# Patient Record
Sex: MALE | Race: WHITE | Employment: OTHER | URBAN - METROPOLITAN AREA
[De-identification: names, ages, dates, MRNs, and addresses within clinical notes are randomized per-mention and may not be internally consistent; named-entity substitution may affect disease eponyms.]

---

## 2022-05-07 ENCOUNTER — HOSPITAL ENCOUNTER (EMERGENCY)
Age: 47
Discharge: HOME | End: 2022-05-07
Attending: EMERGENCY MEDICINE

## 2022-05-07 ENCOUNTER — APPOINTMENT (OUTPATIENT)
Dept: GENERAL RADIOLOGY | Age: 47
End: 2022-05-07
Attending: EMERGENCY MEDICINE

## 2022-05-07 VITALS
HEART RATE: 92 BPM | HEIGHT: 70 IN | WEIGHT: 220 LBS | SYSTOLIC BLOOD PRESSURE: 95 MMHG | BODY MASS INDEX: 31.5 KG/M2 | DIASTOLIC BLOOD PRESSURE: 61 MMHG | RESPIRATION RATE: 16 BRPM | TEMPERATURE: 98 F | OXYGEN SATURATION: 96 %

## 2022-05-07 DIAGNOSIS — M79.641 RIGHT HAND PAIN: Primary | ICD-10-CM

## 2022-05-07 PROCEDURE — 74011250637 HC RX REV CODE- 250/637: Performed by: PHYSICIAN ASSISTANT

## 2022-05-07 PROCEDURE — 73130 X-RAY EXAM OF HAND: CPT

## 2022-05-07 PROCEDURE — 99283 EMERGENCY DEPT VISIT LOW MDM: CPT

## 2022-05-07 RX ORDER — IBUPROFEN 800 MG/1
800 TABLET ORAL
Status: COMPLETED | OUTPATIENT
Start: 2022-05-07 | End: 2022-05-07

## 2022-05-07 RX ADMIN — IBUPROFEN 800 MG: 800 TABLET ORAL at 19:30

## 2022-05-07 NOTE — DISCHARGE INSTRUCTIONS
Patient Requesting a refill.    Medication(s) for Yanet Johnson submitted for a refill request and is pending approval from the Provider.    Caller has been advised that their call does not guarantee an immediate refill. This refill will be reviewed within 24-72 hours by a qualified provider who will determine whether he or she can refill the medication.    Patient has contacted the pharmacy?  n/a    [facility] Response: N/A    Call Back Number: n/a    Can a detailed message be left? Yes_No_---: n/a    Additional information:     Patient is completely out of medications    Patient’s preferred pharmacy has been noted and populated.         [id number] [address] [place]  [address] [address]  Phone: [phone] Fax: [phone]     Use ibuprofen every 8 hours with ice on affected area

## 2022-05-07 NOTE — ED PROVIDER NOTES
27-year-old male who presents to the emergency room with his son who is also a patient at this time, patient's chief complaint today is some right hand pain. He and his son were playing marquez ball together and he accidentally ran his hand into the back of another player. This happened proximate 30 minutes prior to arrival.  Complaining of right hand pain at the second and third finger MCP. Has not use any medication as this occurred just prior to arrival.           No past medical history on file. No past surgical history on file. No family history on file. Social History     Socioeconomic History    Marital status:      Spouse name: Not on file    Number of children: Not on file    Years of education: Not on file    Highest education level: Not on file   Occupational History    Not on file   Tobacco Use    Smoking status: Not on file    Smokeless tobacco: Not on file   Substance and Sexual Activity    Alcohol use: Not on file    Drug use: Not on file    Sexual activity: Not on file   Other Topics Concern    Not on file   Social History Narrative    Not on file     Social Determinants of Health     Financial Resource Strain:     Difficulty of Paying Living Expenses: Not on file   Food Insecurity:     Worried About Running Out of Food in the Last Year: Not on file    [place] of Food in the Last Year: Not on file   Transportation Needs:     Lack of Transportation (Medical): Not on file    Lack of Transportation (Non-Medical):  Not on file   Physical Activity:     Days of Exercise per Week: Not on file    Minutes of Exercise per Session: Not on file   Stress:     Feeling of Stress : Not on file   Social Connections:     Frequency of Communication with Friends and Family: Not on file    Frequency of Social Gatherings with Friends and Family: Not on file    Attends Quaker Services: Not on file    Active Member of Clubs or Organizations: Not on file    Attends Club or Organization Meetings: Not on file    Marital Status: Not on file   Intimate Partner Violence:     Fear of Current or Ex-Partner: Not on file    Emotionally Abused: Not on file    Physically Abused: Not on file    Sexually Abused: Not on file   Housing Stability:     Unable to Pay for Housing in the Last Year: Not on file    Number of [place] in the Last Year: Not on file    Unstable Housing in the Last Year: Not on file         ALLERGIES: Patient has no known allergies. Review of Systems   Constitutional: Negative for chills and fever. HENT: Negative for sore throat. Respiratory: Negative for cough. Cardiovascular: Negative for chest pain. Gastrointestinal: Negative for nausea and vomiting. Musculoskeletal: Positive for arthralgias. Neurological: Negative for dizziness and headaches. All other systems reviewed and are negative. Vitals:    05/07/22 1848   BP: 95/61   Pulse: 92   Resp: 16   Temp: 98 °F (36.7 °C)   SpO2: 96%   Weight: 99.8 kg (220 lb)   Height: 5' 10\" (1.778 m)            Physical Exam  Vitals and nursing note reviewed. Constitutional:       General: He is not in acute distress. Appearance: Normal appearance. He is normal weight. He is not ill-appearing, toxic-appearing or diaphoretic. HENT:      Head: Normocephalic and atraumatic. Eyes:      Pupils: Pupils are equal, round, and reactive to light. Cardiovascular:      Rate and Rhythm: Normal rate. Pulmonary:      Effort: Pulmonary effort is normal.   Abdominal:      General: Abdomen is flat. Musculoskeletal:        Hands:    Neurological:      General: No focal deficit present. Mental Status: He is alert. Psychiatric:         Mood and Affect: Mood normal.          MDM  Number of Diagnoses or Management Options  Diagnosis management comments: 79-year-old male presents with right hand pain. X-ray obtained in triage is unremarkable with no bony abnormalities.   Discharged home with instructions to use ibuprofen and ice. Voice dictation software was used during the making of this note. This software is not perfect and grammatical and other typographical errors may be present. This note has been proofread, but may still contain errors.    Janice Boothe PA-C        Amount and/or Complexity of Data Reviewed  Tests in the radiology section of CPT®: ordered and reviewed  Independent visualization of images, tracings, or specimens: yes    Risk of Complications, Morbidity, and/or Mortality  Presenting problems: low  Diagnostic procedures: low  Management options: low           Procedures

## 2022-05-07 NOTE — ED TRIAGE NOTES
Pt states pain in right hand after he jammed hand into basketball while playing today. Full ROM in fingers and wrist. Masked for triage.